# Patient Record
(demographics unavailable — no encounter records)

---

## 2024-12-11 NOTE — HEALTH RISK ASSESSMENT
[Yes] : Yes [2 - 4 times a month (2 pts)] : 2-4 times a month (2 points) [3 or 4 (1 pt)] : 3 or 4  (1 point) [Never (0 pts)] : Never (0 points) [0] : 2) Feeling down, depressed, or hopeless: Not at all (0) [PHQ-2 Negative - No further assessment needed] : PHQ-2 Negative - No further assessment needed [Patient reported PAP Smear was normal] : Patient reported PAP Smear was normal [Never] : Never [NO] : No

## 2024-12-11 NOTE — PHYSICAL EXAM
[No Acute Distress] : no acute distress [Well-Appearing] : well-appearing [No Lymphadenopathy] : no lymphadenopathy [Supple] : supple [Normal] : normal rate, regular rhythm, normal S1 and S2 and no murmur heard [Pedal Pulses Present] : the pedal pulses are present [No Edema] : there was no peripheral edema [No Extremity Clubbing/Cyanosis] : no extremity clubbing/cyanosis [Non Tender] : non-tender [Non-distended] : non-distended [Normal Bowel Sounds] : normal bowel sounds [de-identified] : L calf with prominent vein visible, not palpable. No tenderness, no edema, no erythema, no knots. LEs symmetrical.

## 2024-12-11 NOTE — ASSESSMENT
[FreeTextEntry1] : #Prominent Vein, LLE Pt noticed after running on the treadmill but it has not resolved. Pulses intact, lower extremities symmetrical without edema, warmth, erythema, or masses. Discussed duplex to evaluate bloodflow.  #HM Due for flu vaccine, does not know if due for tdap. UTD on pap no h/o abn. Using condoms for contraception, does not need STI screen today. Okay with labs.

## 2024-12-11 NOTE — PLAN
[FreeTextEntry1] : #Prominent Vein, LLE - LE duplex - ED precautions reviewed  #HM - Labs today - Can make appt for flu and tdap vaccines

## 2024-12-11 NOTE — HISTORY OF PRESENT ILLNESS
[de-identified] : Ms. JO is a 33 year y/o F with PMH of PCOS who presents as a new patient today to establish care. CC bruising/vein issue after running  #Left Leg Veins Noted bruising after a treadmill workout (does not usually run) on left thigh and also a more prominent LLE vein Still with protruding vein on that side Would like to figure out what is going on Non-painful  #Elevated LDL Last labs Nov, was told to consider lipitor Would like to recheck levels today  PMH: PCOS,  PSH: none Allergies: flagyl (numbness in extremities) Medications: myoinositol and D chiro inconsistently, probiotics, vit C, vit D, zinc, zyrtec, N-acetylcysteine,  Fam Hx: dad  of MI also had DM, DM on both side Social History: Lives with , and a dog named Jason Works as [employer] and  [employer] Tobacco use: never Alcohol use: 3 drinks per week Drug use: none Sexually active: Y, doesn't need Diet & Exercise: not usually running, weights, pilates Mood: neutral, has good days and bad days. Has anxiety. Does not feel depressed. Sees a therapist. Firearms: N  #Health Maintenance Flu shot: interested, will schedule Covid vaccine: had covid Tdap: thinks got titers Pap: UTD, never abn STI screen: not today Family Planning: irregular, condoms, no longer taking hormones. Has tried mirena, OCPs in the past